# Patient Record
(demographics unavailable — no encounter records)

---

## 2024-11-15 NOTE — PHYSICAL EXAM
[Carotid Bruits] : no carotid bruits [Respiratory Effort] : normal respiratory effort [Normal Heart Sounds] : normal heart sounds [Right Carotid Bruit] : no bruit heard over the right carotid [Left Carotid Bruit] : no bruit heard over the left carotid [2+] : left 2+ [Abdomen Tenderness] : ~T ~M No abdominal tenderness [Alert] : alert [Calm] : calm [de-identified] : WN/WD, NAD [de-identified] : NC/AT [de-identified] : supple [de-identified] : FROM [de-identified] : grossly intact

## 2024-11-15 NOTE — ASSESSMENT
[FreeTextEntry1] : 61yoM w/ HTN, HLD, DM, CKD, CAD s/p OPCAB x 2 on 7/21/24. Vascular surgery was consulted for preop carotid duplex which showed some elevated velocities in R ICA ( and EDV 60), suggestive of 50-69% R ICA stenosis. Patient remains asymptomatic and returns for a follow up.    Recommended to stay on ASA and statin and follow up in 3 months for a surveillance carotid duplex. [Carotid Endarterectomy] : carotid endarterectomy

## 2024-11-15 NOTE — HISTORY OF PRESENT ILLNESS
[FreeTextEntry1] : 61yoM w/ HTN, HLD, DM, CKD, CAD s/p OPCAB x 2 on 7/21/24. Vascular surgery was consulted for preop carotid duplex which showed some elevated velocities in R ICA ( and EDV 60), suggestive of 50-69% R ICA stenosis. No vascular intervention was indicated, and he returns for a follow up.

## 2024-11-15 NOTE — REVIEW OF SYSTEMS
[Chest Pain] : no chest pain [Dizziness] : no dizziness [Fainting] : no fainting [Negative] : Heme/Lymph